# Patient Record
Sex: FEMALE | Race: WHITE | NOT HISPANIC OR LATINO | Employment: FULL TIME | URBAN - METROPOLITAN AREA
[De-identification: names, ages, dates, MRNs, and addresses within clinical notes are randomized per-mention and may not be internally consistent; named-entity substitution may affect disease eponyms.]

---
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
## 2023-01-04 ENCOUNTER — OFFICE VISIT (OUTPATIENT)
Dept: FAMILY MEDICINE | Facility: CLINIC | Age: 18
End: 2023-01-04
Payer: COMMERCIAL

## 2023-01-04 VITALS
HEART RATE: 92 BPM | DIASTOLIC BLOOD PRESSURE: 77 MMHG | OXYGEN SATURATION: 98 % | TEMPERATURE: 97.4 F | BODY MASS INDEX: 19.74 KG/M2 | WEIGHT: 113.2 LBS | RESPIRATION RATE: 20 BRPM | SYSTOLIC BLOOD PRESSURE: 122 MMHG

## 2023-01-04 DIAGNOSIS — N30.01 ACUTE CYSTITIS WITH HEMATURIA: Primary | ICD-10-CM

## 2023-01-04 DIAGNOSIS — R35.0 INCREASED FREQUENCY OF URINATION: ICD-10-CM

## 2023-01-04 LAB
ALBUMIN UR-MCNC: NEGATIVE MG/DL
APPEARANCE UR: ABNORMAL
BACTERIA #/AREA URNS HPF: ABNORMAL /HPF
BILIRUB UR QL STRIP: NEGATIVE
COLOR UR AUTO: YELLOW
GLUCOSE UR STRIP-MCNC: NEGATIVE MG/DL
HGB UR QL STRIP: ABNORMAL
KETONES UR STRIP-MCNC: NEGATIVE MG/DL
LEUKOCYTE ESTERASE UR QL STRIP: ABNORMAL
NITRATE UR QL: NEGATIVE
PH UR STRIP: 6.5 [PH] (ref 5–8)
RBC #/AREA URNS AUTO: ABNORMAL /HPF
SP GR UR STRIP: 1.02 (ref 1–1.03)
SQUAMOUS #/AREA URNS AUTO: ABNORMAL /LPF
UROBILINOGEN UR STRIP-ACNC: 0.2 E.U./DL
WBC #/AREA URNS AUTO: ABNORMAL /HPF

## 2023-01-04 PROCEDURE — 87086 URINE CULTURE/COLONY COUNT: CPT | Performed by: FAMILY MEDICINE

## 2023-01-04 PROCEDURE — 87186 SC STD MICRODIL/AGAR DIL: CPT | Performed by: FAMILY MEDICINE

## 2023-01-04 PROCEDURE — 99214 OFFICE O/P EST MOD 30 MIN: CPT | Performed by: FAMILY MEDICINE

## 2023-01-04 PROCEDURE — 81001 URINALYSIS AUTO W/SCOPE: CPT | Performed by: FAMILY MEDICINE

## 2023-01-04 RX ORDER — NITROFURANTOIN 25; 75 MG/1; MG/1
100 CAPSULE ORAL 2 TIMES DAILY
Qty: 10 CAPSULE | Refills: 0 | Status: SHIPPED | OUTPATIENT
Start: 2023-01-04 | End: 2023-02-10

## 2023-01-04 RX ORDER — NITROFURANTOIN 25; 75 MG/1; MG/1
100 CAPSULE ORAL 2 TIMES DAILY
Qty: 10 CAPSULE | Refills: 0 | Status: SHIPPED | OUTPATIENT
Start: 2023-01-04 | End: 2023-01-04

## 2023-01-05 NOTE — PROGRESS NOTES
Assessment:       Acute cystitis with hematuria  - nitroFURantoin macrocrystal-monohydrate (MACROBID) 100 MG capsule  Dispense: 10 capsule; Refill: 0    Increased frequency of urination  - UA macro with reflex to Microscopic and Culture - Clinc Collect  - Urine Microscopic  - Urine Culture    Plan:     Symptoms consistent with a urinary tract infection.  UA macro and micro reviewed however, and it looks like it may be a contaminated specimen although there are some increased white blood cells as well as small leukocyte esterase present.  Given her symptoms that she has been having we will go ahead and treat her empirically with Macrobid.  If her culture does come back negative recommend stopping the antibiotic and follow-up if symptoms are persisting.  Patient expresses understanding of this and we will notify her of the results when we get it back.   Recommend pushing fluids and follow-up if symptoms are getting worse or not improving over the next 2 to 3 days.      MEDICATIONS:   Orders Placed This Encounter   Medications     nitroFURantoin macrocrystal-monohydrate (MACROBID) 100 MG capsule     Sig: Take 1 capsule (100 mg) by mouth 2 times daily for 5 days     Dispense:  10 capsule     Refill:  0         Subjective:       17 year old female presents for evaluation of a 4-day history of burning with urination and increased urinary frequency and low abdominal pressure.  She thinks she may have had some blood in her urine as well.  Is been particularly bothersome for her at night as this is caused her to get up frequently.  She denies any fevers, chills, nausea, vomiting, or abnormal vaginal discharge.    She is prone to getting recurrent UTIs and takes Bactrim following intercourse for prevention.    Patient Active Problem List   Diagnosis     NICOLE (generalized anxiety disorder)     Eating disorder, unspecified type       Past Medical History:   Diagnosis Date     Uncomplicated asthma 04/2013    I do not know the  exact date but was diagnosed when I was 8       No past surgical history on file.    Current Outpatient Medications   Medication     albuterol (PROAIR HFA/PROVENTIL HFA/VENTOLIN HFA) 108 (90 Base) MCG/ACT inhaler     sulfamethoxazole-trimethoprim (BACTRIM DS) 800-160 MG tablet     escitalopram (LEXAPRO) 10 MG tablet     hydrOXYzine (ATARAX) 25 MG tablet     Current Facility-Administered Medications   Medication     medroxyPROGESTERone (DEPO-PROVERA) injection 150 mg       No Known Allergies    Family History   Problem Relation Age of Onset     Unknown/Adopted Mother      Unknown/Adopted Father        Social History     Socioeconomic History     Marital status: Single     Spouse name: None     Number of children: None     Years of education: None     Highest education level: None   Tobacco Use     Smoking status: Every Day     Types: Other     Smokeless tobacco: Never     Tobacco comments:     I use a vape everyday.   Vaping Use     Vaping Use: Every day     Substances: Nicotine, Flavoring   Substance and Sexual Activity     Alcohol use: Not Currently     Drug use: Never     Sexual activity: Yes     Partners: Male     Birth control/protection: Injection     Comment: Depo shot   Other Topics Concern     Parent/sibling w/ CABG, MI or angioplasty before 65F 55M? No         Review of Systems  Pertinent items are noted in HPI.      Objective:     /77 (BP Location: Right arm, Patient Position: Sitting, Cuff Size: Adult Regular)   Pulse 92   Temp 97.4  F (36.3  C)   Resp 20   Wt 51.3 kg (113 lb 3.2 oz)   LMP 12/27/2022   SpO2 98%   BMI 19.74 kg/m       General appearance: alert, appears stated age and cooperative  Back: No CVA tenderness  Abdomen: soft, non-tender; bowel sounds normal; no masses,  no organomegaly       Results for orders placed or performed in visit on 01/04/23   UA macro with reflex to Microscopic and Culture - Clinc Collect     Status: Abnormal    Specimen: Urine, Clean Catch   Result Value  Ref Range    Color Urine Yellow Colorless, Straw, Light Yellow, Yellow    Appearance Urine Slightly Cloudy (A) Clear    Glucose Urine Negative Negative mg/dL    Bilirubin Urine Negative Negative    Ketones Urine Negative Negative mg/dL    Specific Gravity Urine 1.020 1.005 - 1.030    Blood Urine Trace (A) Negative    pH Urine 6.5 5.0 - 8.0    Protein Albumin Urine Negative Negative mg/dL    Urobilinogen Urine 0.2 0.2, 1.0 E.U./dL    Nitrite Urine Negative Negative    Leukocyte Esterase Urine Small (A) Negative   Urine Microscopic     Status: Abnormal   Result Value Ref Range    Bacteria Urine Few (A) None Seen /HPF    RBC Urine 2-5 (A) 0-2 /HPF /HPF    WBC Urine 10-25 (A) 0-5 /HPF /HPF    Squamous Epithelials Urine Many (A) None Seen /LPF       This note has been dictated using voice recognition software. Any grammatical or context distortions are unintentional and inherent to the software

## 2023-01-06 LAB
BACTERIA UR CULT: ABNORMAL
BACTERIA UR CULT: ABNORMAL

## 2023-02-05 ENCOUNTER — HEALTH MAINTENANCE LETTER (OUTPATIENT)
Age: 18
End: 2023-02-05

## 2023-02-08 ASSESSMENT — ASTHMA QUESTIONNAIRES
QUESTION_1 LAST FOUR WEEKS HOW MUCH OF THE TIME DID YOUR ASTHMA KEEP YOU FROM GETTING AS MUCH DONE AT WORK, SCHOOL OR AT HOME: A LITTLE OF THE TIME
ACT_TOTALSCORE: 15
QUESTION_2 LAST FOUR WEEKS HOW OFTEN HAVE YOU HAD SHORTNESS OF BREATH: ONCE A DAY
QUESTION_3 LAST FOUR WEEKS HOW OFTEN DID YOUR ASTHMA SYMPTOMS (WHEEZING, COUGHING, SHORTNESS OF BREATH, CHEST TIGHTNESS OR PAIN) WAKE YOU UP AT NIGHT OR EARLIER THAN USUAL IN THE MORNING: NOT AT ALL
ACT_TOTALSCORE: 15
QUESTION_5 LAST FOUR WEEKS HOW WOULD YOU RATE YOUR ASTHMA CONTROL: SOMEWHAT CONTROLLED
QUESTION_4 LAST FOUR WEEKS HOW OFTEN HAVE YOU USED YOUR RESCUE INHALER OR NEBULIZER MEDICATION (SUCH AS ALBUTEROL): THREE OR MORE TIMES PER DAY

## 2023-02-08 ASSESSMENT — PATIENT HEALTH QUESTIONNAIRE - PHQ9
10. IF YOU CHECKED OFF ANY PROBLEMS, HOW DIFFICULT HAVE THESE PROBLEMS MADE IT FOR YOU TO DO YOUR WORK, TAKE CARE OF THINGS AT HOME, OR GET ALONG WITH OTHER PEOPLE: VERY DIFFICULT
SUM OF ALL RESPONSES TO PHQ QUESTIONS 1-9: 12
SUM OF ALL RESPONSES TO PHQ QUESTIONS 1-9: 12

## 2023-02-10 ENCOUNTER — VIRTUAL VISIT (OUTPATIENT)
Dept: FAMILY MEDICINE | Facility: CLINIC | Age: 18
End: 2023-02-10
Payer: COMMERCIAL

## 2023-02-10 DIAGNOSIS — N39.0 RECURRENT UTI: ICD-10-CM

## 2023-02-10 PROCEDURE — 99213 OFFICE O/P EST LOW 20 MIN: CPT | Mod: VID | Performed by: PHYSICIAN ASSISTANT

## 2023-02-10 RX ORDER — SULFAMETHOXAZOLE/TRIMETHOPRIM 800-160 MG
TABLET ORAL
Qty: 30 TABLET | Refills: 4 | Status: SHIPPED | OUTPATIENT
Start: 2023-02-10

## 2023-02-10 ASSESSMENT — PATIENT HEALTH QUESTIONNAIRE - PHQ9
10. IF YOU CHECKED OFF ANY PROBLEMS, HOW DIFFICULT HAVE THESE PROBLEMS MADE IT FOR YOU TO DO YOUR WORK, TAKE CARE OF THINGS AT HOME, OR GET ALONG WITH OTHER PEOPLE: VERY DIFFICULT
SUM OF ALL RESPONSES TO PHQ QUESTIONS 1-9: 12

## 2023-02-10 NOTE — PROGRESS NOTES
Delaney is a 17 year old who is being evaluated via a billable video visit.      How would you like to obtain your AVS? MyChart  If the video visit is dropped, the invitation should be resent by: Text to cell phone: [phone]  Will anyone else be joining your video visit? No    Assessment & Plan     Recurrent UTI  Treat.  Discussed probiotics as well.  - sulfamethoxazole-trimethoprim (BACTRIM DS) 800-160 MG tablet  Dispense: 30 tablet; Refill: 4    There are no Patient Instructions on file for this visit.    No follow-ups on file.    Eduarda Khoury PA-C  [facility]    Subjective   Delaney is a 17 year old, presenting for the following health issues:  No chief complaint on file.      History of Present Illness       Reason for visit:  To refill a preventative drug that has been working wonders    She eats 2-3 servings of fruits and vegetables daily.She consumes 1 sweetened beverage(s) daily.She exercises with enough effort to increase her heart rate 30 to 60 minutes per day.  She exercises with enough effort to increase her heart rate 4 days per week. She is missing 7 dose(s) of medications per week.  She is not taking prescribed medications regularly due to other.    Today's PHQ-9         PHQ-9 Total Score: 12    PHQ-9 Q9 Thoughts of better off dead/self-harm past 2 weeks :   Not at all    How difficult have these problems made it for you to do your work, take care of things at home, or get along with other people: Very difficult     Works as residential care assistant.  Refill bactrim.  Had UTI in June, Oct, Nov.  Cultures have NOT always grown out as actual infection, and cultures show multi drug resistance including to bactrim.  Has been on bactrim 1 dose after sex.  No UTI associated with sex since starting this med.  Wilber did get UTI but this was not from sex, it was from panty-liners during menses.  Is on depo, gets some spotting.  Yeast infection with tampons.    Feels she  has just generally been more prone to UTI since having sex.  Does try cranberry gummies.        Objective           Vitals:  No vitals were obtained today due to virtual visit.          Video-Visit Details    Type of service:  Video Visit     Originating Location (pt. Location): Home  Distant Location (provider location):  On-site  Platform used for Video Visit: Sumanth

## 2024-03-03 ENCOUNTER — HEALTH MAINTENANCE LETTER (OUTPATIENT)
Age: 19
End: 2024-03-03

## 2025-03-15 ENCOUNTER — HEALTH MAINTENANCE LETTER (OUTPATIENT)
Age: 20
End: 2025-03-15